# Patient Record
Sex: FEMALE | Race: OTHER | NOT HISPANIC OR LATINO | Employment: UNEMPLOYED | URBAN - METROPOLITAN AREA
[De-identification: names, ages, dates, MRNs, and addresses within clinical notes are randomized per-mention and may not be internally consistent; named-entity substitution may affect disease eponyms.]

---

## 2018-08-31 ENCOUNTER — APPOINTMENT (OUTPATIENT)
Dept: URGENT CARE | Age: 28
End: 2018-08-31
Payer: OTHER MISCELLANEOUS

## 2018-08-31 ENCOUNTER — APPOINTMENT (OUTPATIENT)
Dept: RADIOLOGY | Age: 28
End: 2018-08-31
Attending: PHYSICIAN ASSISTANT
Payer: OTHER MISCELLANEOUS

## 2018-08-31 DIAGNOSIS — S89.92XA KNEE INJURY, LEFT, INITIAL ENCOUNTER: ICD-10-CM

## 2018-08-31 DIAGNOSIS — S89.92XA KNEE INJURY, LEFT, INITIAL ENCOUNTER: Primary | ICD-10-CM

## 2018-08-31 PROCEDURE — 73564 X-RAY EXAM KNEE 4 OR MORE: CPT

## 2018-08-31 PROCEDURE — G0382 LEV 3 HOSP TYPE B ED VISIT: HCPCS | Performed by: PREVENTIVE MEDICINE

## 2018-08-31 PROCEDURE — 99283 EMERGENCY DEPT VISIT LOW MDM: CPT | Performed by: PREVENTIVE MEDICINE

## 2018-09-02 ENCOUNTER — APPOINTMENT (OUTPATIENT)
Dept: URGENT CARE | Age: 28
End: 2018-09-02
Payer: OTHER MISCELLANEOUS

## 2018-09-02 PROCEDURE — 99213 OFFICE O/P EST LOW 20 MIN: CPT

## 2018-09-06 ENCOUNTER — APPOINTMENT (OUTPATIENT)
Dept: URGENT CARE | Age: 28
End: 2018-09-06
Payer: OTHER MISCELLANEOUS

## 2018-09-06 PROCEDURE — 99213 OFFICE O/P EST LOW 20 MIN: CPT | Performed by: PREVENTIVE MEDICINE

## 2018-09-14 ENCOUNTER — APPOINTMENT (OUTPATIENT)
Dept: URGENT CARE | Age: 28
End: 2018-09-14
Payer: OTHER MISCELLANEOUS

## 2018-09-14 PROCEDURE — 99214 OFFICE O/P EST MOD 30 MIN: CPT | Performed by: PREVENTIVE MEDICINE

## 2018-09-18 ENCOUNTER — OFFICE VISIT (OUTPATIENT)
Dept: SURGERY | Facility: CLINIC | Age: 28
End: 2018-09-18
Payer: COMMERCIAL

## 2018-09-18 VITALS
BODY MASS INDEX: 25.69 KG/M2 | WEIGHT: 145 LBS | HEART RATE: 85 BPM | SYSTOLIC BLOOD PRESSURE: 118 MMHG | RESPIRATION RATE: 16 BRPM | TEMPERATURE: 97.9 F | DIASTOLIC BLOOD PRESSURE: 78 MMHG | HEIGHT: 63 IN

## 2018-09-18 DIAGNOSIS — R10.13 EPIGASTRIC PAIN: ICD-10-CM

## 2018-09-18 DIAGNOSIS — M62.08 RECTUS DIASTASIS: Primary | ICD-10-CM

## 2018-09-18 PROCEDURE — 99243 OFF/OP CNSLTJ NEW/EST LOW 30: CPT | Performed by: PHYSICIAN ASSISTANT

## 2018-09-18 RX ORDER — NAPROXEN 500 MG/1
500 TABLET ORAL 2 TIMES DAILY
Refills: 0 | COMMUNITY
Start: 2018-09-03

## 2018-09-18 RX ORDER — IBUPROFEN 600 MG/1
TABLET ORAL
Refills: 0 | COMMUNITY
Start: 2018-09-03

## 2018-09-18 NOTE — LETTER
September 18, 2018     Referral [address] [place] [id number]    Patient: Pippa Segura   YOB: 1990   Date of Visit: 9/18/2018       Dear Dr Irma Bolton:    Thank you for referring Pippa Segura to me for evaluation  Below are my notes for this consultation  If you have questions, please do not hesitate to call me  I look forward to following your patient along with you  Sincerely,        Aranza Blanco PA-C        CC: No Recipients  Daniel Guerrero  9/18/2018 11:34 AM  Sign at close encounter  Assessment/Plan:   Pippa Segura is a 33 y  o female who is here for The primary encounter diagnosis was Rectus diastasis  A diagnosis of Epigastric pain was also pertinent to this visit  Patient with abdominal bloating and bulge in midline of abdomen and is concern for hernia  Patient had prior abdominal plasty in the \[facility]\""  On physical exam no hernia defect mild laxity or recurrence rectus diastasis    Plan:  Refer to plastic surgeon for evaluation of previous abdominal plasty      _____________________________________________      HPI:  Pippa Segura is a 33 y  o female who was referred for evaluation of Mass (abdominal)    Patient with epigastric discomfort after eating and abdominal bloating and bulge from umbilicus to sternum  Currently complaining of  initial abdominal pain and bulge  Worse after eating,     no nausea and no vomiting,     regular bowel movement      Duration of pain or symptoms:  Intermittent and over 3 months      Prior abdominal surgery:   Yes, abdominal plasty      No results found for: WBC, HGB, HCT, MCV, PLT  No results found for: NA, K, CL, CO2, ANIONGAP, BUN, CREATININE, GLUCOSE, GLUF, CALCIUM, CORRECTEDCA, AST, ALT, ALKPHOS, PROT, ALBUMIN, BILITOT, EGFR  No results found for: INR, PROTIME        ROS:  General ROS: negative  negative for - chills, fatigue, fever or night sweats, weight loss  Respiratory ROS: no cough, shortness of breath, or wheezing  Cardiovascular ROS: no chest pain or dyspnea on exertion  Genito-Urinary ROS: no dysuria, trouble voiding, or hematuria  Musculoskeletal ROS: negative for - gait disturbance, joint pain or muscle pain  Neurological ROS: no TIA or stroke symptoms  Abdominal ROS: see HPI  GI ROS: see HPI  Skin ROS: no new rashes or lesions   Lymphatic ROS: no new adenopathy noted by pt  GYN ROS: see HPI, no new GYN history or bleeding noted  Psy ROS: no new mental or behavioral disturbances       There is no problem list on file for this patient  Allergies: Patient has no known allergies  Meds:  Current Outpatient Prescriptions:     ibuprofen (MOTRIN) 600 mg tablet, TAKE 1 TABLET 3 TIMES A DAY AS NEEDED FOR PAIN AND INFLAMMATION, Disp: , Rfl: 0    naproxen (NAPROSYN) 500 mg tablet, 500 mg 2 (two) times a day, Disp: , Rfl: 0    PMH:  Past Medical History:   Diagnosis Date    Migraine     Migraines        PSH:  Past Surgical History:   Procedure Laterality Date    TONSILECTOMY AND ADNOIDECTOMY         Family History   Problem Relation Age of Onset    Hypertension Mother     Hypertension Father         reports that she has never smoked  She has never used smokeless tobacco  She reports that she does not drink alcohol or use drugs        PHYSICAL EXAM  General Appearance:    Alert, cooperative, no distress   Head:    Normocephalic without obvious abnormality   Eyes:    PERRL, conjunctiva/corneas clear, EOM's intact        Neck:   Supple, no adenopathy, no JVD   Back:     Symmetric, no spinal or CVA tenderness   Lungs:     Clear to auscultation bilaterally, no wheezing or rhonchi   Heart:    Regular rate and rhythm, S1 and S2 normal, no murmur   Abdomen:      abdomen is soft without significant tenderness, masses, organomegaly or guarding Bowel sounds are normal     No ventral hernia noted     Extremities:   Extremities normal  No clubbing, cyanosis or edema   Psych:   Normal Affect, AOx3     Neurologic:  Skin:   CNII-XII intact  Strength symmetric, speech intact    Warm, dry, intact, no visible rashes or lesions                   Informed consent for procedure was personally discussed, reviewed, and signed by Dr Susana Holley  Discussion by Dr Susnaa Holley was carried out regarding risks, benefits, and alternatives with the patient  Risks include but are not limited to:  bleeding, infection, and delayed wound healing or an open wound, pulmonary embolus, leaks from bowel or bile ducts or other viscus, transfusions, death  Discussed in further detail the more common complications and their rates of occurrence   was used if necessary  Patient expressed understanding of the issues discussed and wished/consented to proceed  All questions were answered by Dr Susana Holley  Discussion performed between patient and the provider signing below  Signature:   Luke Terry    Date: 9/18/2018 Time: 11:31 AM       Some portions of this record may have been generated with voice recognition software  There may be translation, syntax,  or grammatical errors  Occasional wrong word or "sound-a-like" substitutions may have occurred due to the inherent limitations of the voice recognition software  Read the chart carefully and recognize, using context, where substitutions may have occurred  If you have any questions, please contact the dictating provider for clarification or correction, as needed  This encounter has been coded by a non-certified coder

## 2018-09-18 NOTE — PROGRESS NOTES
Assessment/Plan:   Marlene Georges is a 33 y  o female who is here for The primary encounter diagnosis was Rectus diastasis  A diagnosis of Epigastric pain was also pertinent to this visit  Patient with abdominal bloating and bulge in midline of abdomen and is concern for hernia  Patient had prior abdominal plasty in the [facility]  On physical exam no hernia defect mild laxity or recurrence rectus diastasis    Plan:  Refer to plastic surgeon for evaluation of previous abdominal plasty      _____________________________________________      HPI:  Marlene Georges is a 33 y  o female who was referred for evaluation of Mass (abdominal)    Patient with epigastric discomfort after eating and abdominal bloating and bulge from umbilicus to sternum  Patient with history of abdominal plasty in the past   Patient had 2 pregnancies  Currently complaining of  initial abdominal pain and bulge  Worse after eating,     no nausea and no vomiting,     regular bowel movement  Duration of pain or symptoms:  Intermittent and over 3 months      Prior abdominal surgery:   Yes, abdominal plasty      No results found for: WBC, HGB, HCT, MCV, PLT  No results found for: NA, K, CL, CO2, ANIONGAP, BUN, CREATININE, GLUCOSE, GLUF, CALCIUM, CORRECTEDCA, AST, ALT, ALKPHOS, PROT, ALBUMIN, BILITOT, EGFR  No results found for: INR, PROTIME        ROS:  General ROS: negative  negative for - chills, fatigue, fever or night sweats, weight loss  Respiratory ROS: no cough, shortness of breath, or wheezing  Cardiovascular ROS: no chest pain or dyspnea on exertion  Genito-Urinary ROS: no dysuria, trouble voiding, or hematuria  Musculoskeletal ROS: negative for - gait disturbance, joint pain or muscle pain  Neurological ROS: no TIA or stroke symptoms  Abdominal ROS: see HPI  GI ROS: see HPI  Skin ROS: no new rashes or lesions   Lymphatic ROS: no new adenopathy noted by pt     GYN ROS: see HPI, no new GYN history or bleeding noted  Psy ROS: no new mental or behavioral disturbances       There is no problem list on file for this patient  Allergies: Patient has no known allergies  Meds:  Current Outpatient Prescriptions:     ibuprofen (MOTRIN) 600 mg tablet, TAKE 1 TABLET 3 TIMES A DAY AS NEEDED FOR PAIN AND INFLAMMATION, Disp: , Rfl: 0    naproxen (NAPROSYN) 500 mg tablet, 500 mg 2 (two) times a day, Disp: , Rfl: 0    PMH:  Past Medical History:   Diagnosis Date    Migraine     Migraines        PSH:  Past Surgical History:   Procedure Laterality Date    TONSILECTOMY AND ADNOIDECTOMY         Family History   Problem Relation Age of Onset    Hypertension Mother     Hypertension Father         reports that she has never smoked  She has never used smokeless tobacco  She reports that she does not drink alcohol or use drugs  PHYSICAL EXAM  General Appearance:    Alert, cooperative, no distress   Head:    Normocephalic without obvious abnormality   Eyes:    PERRL, conjunctiva/corneas clear, EOM's intact        Neck:   Supple, no adenopathy, no JVD   Back:     Symmetric, no spinal or CVA tenderness   Lungs:     Clear to auscultation bilaterally, no wheezing or rhonchi   Heart:    Regular rate and rhythm, S1 and S2 normal, no murmur   Abdomen:      abdomen is soft without significant tenderness, masses, organomegaly or guarding Bowel sounds are normal     No ventral hernia noted     Extremities:   Extremities normal  No clubbing, cyanosis or edema   Psych:   Normal Affect, AOx3  Neurologic:  Skin:   CNII-XII intact  Strength symmetric, speech intact    Warm, dry, intact, no visible rashes or lesions                   Informed consent for procedure was personally discussed, reviewed, and signed by Dr Darlene Cantrell  Discussion by Dr Darlene Cantrell was carried out regarding risks, benefits, and alternatives with the patient   Risks include but are not limited to:  bleeding, infection, and delayed wound healing or an open wound, pulmonary embolus, leaks from bowel or bile ducts or other viscus, transfusions, death  Discussed in further detail the more common complications and their rates of occurrence   was used if necessary  Patient expressed understanding of the issues discussed and wished/consented to proceed  All questions were answered by Dr Susana Holley  Discussion performed between patient and the provider signing below  Signature:   Luke Harrison    Date: 9/18/2018 Time: 11:31 AM       Some portions of this record may have been generated with voice recognition software  There may be translation, syntax,  or grammatical errors  Occasional wrong word or "sound-a-like" substitutions may have occurred due to the inherent limitations of the voice recognition software  Read the chart carefully and recognize, using context, where substitutions may have occurred  If you have any questions, please contact the dictating provider for clarification or correction, as needed  This encounter has been coded by a non-certified coder

## 2018-09-28 ENCOUNTER — APPOINTMENT (OUTPATIENT)
Dept: URGENT CARE | Age: 28
End: 2018-09-28
Payer: OTHER MISCELLANEOUS

## 2018-09-28 PROCEDURE — 99214 OFFICE O/P EST MOD 30 MIN: CPT | Performed by: PREVENTIVE MEDICINE

## 2018-12-17 ENCOUNTER — OFFICE VISIT (OUTPATIENT)
Dept: URGENT CARE | Age: 28
End: 2018-12-17
Payer: COMMERCIAL

## 2018-12-17 VITALS
OXYGEN SATURATION: 100 % | TEMPERATURE: 99 F | HEIGHT: 64 IN | HEART RATE: 140 BPM | BODY MASS INDEX: 24.59 KG/M2 | DIASTOLIC BLOOD PRESSURE: 68 MMHG | SYSTOLIC BLOOD PRESSURE: 115 MMHG | WEIGHT: 144 LBS

## 2018-12-17 DIAGNOSIS — R51.9 ACUTE INTRACTABLE HEADACHE, UNSPECIFIED HEADACHE TYPE: ICD-10-CM

## 2018-12-17 DIAGNOSIS — J11.1 INFLUENZA: Primary | ICD-10-CM

## 2018-12-17 DIAGNOSIS — R00.0 TACHYCARDIA: ICD-10-CM

## 2018-12-17 PROCEDURE — 99213 OFFICE O/P EST LOW 20 MIN: CPT | Performed by: FAMILY MEDICINE

## 2018-12-17 NOTE — PROGRESS NOTES
[phone]        NAME: America Alvarez is a 33 y o  female  : 1990    MRN: [id number]  DATE: 2018  TIME: 12:53 PM    Assessment and Plan   Influenza [J11 1]  1  Influenza  Transfer to other facility   2  Tachycardia  Transfer to other facility   3  Acute intractable headache, unspecified headache type  Transfer to other facility         Patient Instructions       Follow up with PCP in 3-5 days  Proceed to  ER if symptoms worsen  Chief Complaint     Chief Complaint   Patient presents with    Headache     c/o migraine x friday, with nausea and vomiting, sensitive to sounds and lights   Cough     chills, cough and post nasal drip          History of Present Illness       Patient is here for evaluation of rapid onset of headache, fevers, chills, body aches on Friday  Patient has had some cough and congestion as well  She states last night she was unable to sleep due to the headache which was not relieved by any of her usual medications  She has been feeling lightheaded and dizzy at times off and on  Her kids were sick recently with upper stye infections but neither of them for this severe          Review of Systems   Review of Systems      Current Medications       Current Outpatient Prescriptions:     ibuprofen (MOTRIN) 600 mg tablet, TAKE 1 TABLET 3 TIMES A DAY AS NEEDED FOR PAIN AND INFLAMMATION, Disp: , Rfl: 0    naproxen (NAPROSYN) 500 mg tablet, 500 mg 2 (two) times a day, Disp: , Rfl: 0    Current Allergies     Allergies as of 2018    (No Known Allergies)            The following portions of the patient's history were reviewed and updated as appropriate: allergies, current medications, past family history, past medical history, past social history, past surgical history and problem list      Past Medical History:   Diagnosis Date    Migraine     Migraines        Past Surgical History:   Procedure Laterality Date    TONSILECTOMY AND ADNOIDECTOMY         Family History   Problem Relation Age of Onset    Hypertension Mother     Hypertension Father          Medications have been verified  Objective   /68   Pulse (!) 140   Temp 99 °F (37 2 °C)   Ht 5' 4" (1 626 m)   Wt 65 3 kg (144 lb)   LMP 12/17/2018   SpO2 100%   BMI 24 72 kg/m²        Physical Exam     Physical Exam   Constitutional: She is oriented to person, place, and time  She appears well-developed and well-nourished  She appears ill  HENT:   Head: Normocephalic and atraumatic  Right Ear: External ear normal    Left Ear: External ear normal    Mild erythema bilateral tonsils  No soft tissue swelling  No exudate  Bilateral nasal erythema with mild congestion  Clear rhinorrhea present  Eyes: Pupils are equal, round, and reactive to light  Conjunctivae and EOM are normal  Right eye exhibits no discharge  Left eye exhibits no discharge  Neck: Normal range of motion  Neck supple  Cardiovascular: Normal heart sounds  Tachycardia present  No murmur heard  132-140 bpm   Pulmonary/Chest: Effort normal and breath sounds normal  No respiratory distress  She has no wheezes  She has no rales  Lymphadenopathy:     She has no cervical adenopathy  Neurological: She is alert and oriented to person, place, and time  Skin: Skin is warm and dry  Psychiatric: She has a normal mood and affect  Her behavior is normal    Nursing note and vitals reviewed

## 2019-11-29 ENCOUNTER — HOSPITAL ENCOUNTER (EMERGENCY)
Facility: HOSPITAL | Age: 29
Discharge: HOME | End: 2019-11-30
Attending: EMERGENCY MEDICINE | Admitting: EMERGENCY MEDICINE
Payer: MEDICARE

## 2019-11-29 ENCOUNTER — APPOINTMENT (EMERGENCY)
Dept: CT IMAGING | Facility: HOSPITAL | Age: 29
End: 2019-11-29
Payer: MEDICARE

## 2019-11-29 DIAGNOSIS — E86.0 DEHYDRATION: ICD-10-CM

## 2019-11-29 DIAGNOSIS — K80.20 CHOLELITHIASES: ICD-10-CM

## 2019-11-29 DIAGNOSIS — R10.9 ABDOMINAL PAIN: Primary | ICD-10-CM

## 2019-11-29 LAB
ALBUMIN SERPL BCP-MCNC: 4.7 G/DL (ref 3–5.2)
ALP SERPL-CCNC: 76 U/L (ref 43–122)
ALT SERPL W P-5'-P-CCNC: 51 U/L (ref 9–52)
ANION GAP SERPL CALCULATED.3IONS-SCNC: 12 MMOL/L (ref 5–14)
APTT PPP: 22 SECONDS (ref 25–32)
AST SERPL W P-5'-P-CCNC: 94 U/L (ref 14–36)
BILIRUB SERPL-MCNC: 0.3 MG/DL
BUN SERPL-MCNC: 11 MG/DL (ref 5–25)
CALCIUM SERPL-MCNC: 9.8 MG/DL (ref 8.4–10.2)
CHLORIDE SERPL-SCNC: 104 MMOL/L (ref 97–108)
CO2 SERPL-SCNC: 24 MMOL/L (ref 22–30)
CREAT SERPL-MCNC: 0.47 MG/DL (ref 0.6–1.2)
ERYTHROCYTE [DISTWIDTH] IN BLOOD BY AUTOMATED COUNT: 17.4 %
GFR SERPL CREATININE-BSD FRML MDRD: 135 ML/MIN/1.73SQ M
GLUCOSE SERPL-MCNC: 128 MG/DL (ref 70–99)
HCG SERPL QL: NEGATIVE
HCT VFR BLD AUTO: 38.2 % (ref 36–46)
HGB BLD-MCNC: 12.1 G/DL (ref 12–16)
INR PPP: 0.94 (ref 0.91–1.09)
LACTATE SERPL-SCNC: 2.3 MMOL/L (ref 0.7–2)
LIPASE SERPL-CCNC: 137 U/L (ref 23–300)
LYMPHOCYTES # BLD AUTO: 29 % (ref 25–45)
LYMPHOCYTES # BLD AUTO: 4.18 THOUSAND/UL (ref 0.5–4)
MAGNESIUM SERPL-MCNC: 2.2 MG/DL (ref 1.6–2.3)
MCH RBC QN AUTO: 23.8 PG (ref 26–34)
MCHC RBC AUTO-ENTMCNC: 31.6 G/DL (ref 31–36)
MCV RBC AUTO: 75 FL (ref 80–100)
MONOCYTES # BLD AUTO: 0.43 THOUSAND/UL (ref 0.2–0.9)
MONOCYTES NFR BLD AUTO: 3 % (ref 1–10)
NEUTS SEG # BLD: 9.5 THOUSAND/UL (ref 1.8–7.8)
NEUTS SEG NFR BLD AUTO: 66 %
PLATELET # BLD AUTO: 391 THOUSANDS/UL (ref 150–450)
PLATELET BLD QL SMEAR: ADEQUATE
PMV BLD AUTO: 8.1 FL (ref 8.9–12.7)
POTASSIUM SERPL-SCNC: 3.8 MMOL/L (ref 3.6–5)
PROT SERPL-MCNC: 8.5 G/DL (ref 5.9–8.4)
PROTHROMBIN TIME: 10.3 SECONDS (ref 9.8–12)
RBC # BLD AUTO: 5.08 MILLION/UL (ref 4–5.2)
RBC MORPH BLD: NORMAL
SODIUM SERPL-SCNC: 140 MMOL/L (ref 137–147)
TOTAL CELLS COUNTED SPEC: 100
VARIANT LYMPHS # BLD AUTO: 2 % (ref 0–0)
WBC # BLD AUTO: 14.4 THOUSAND/UL (ref 4.5–11)

## 2019-11-29 PROCEDURE — 96374 THER/PROPH/DIAG INJ IV PUSH: CPT

## 2019-11-29 PROCEDURE — 85007 BL SMEAR W/DIFF WBC COUNT: CPT | Performed by: EMERGENCY MEDICINE

## 2019-11-29 PROCEDURE — 85610 PROTHROMBIN TIME: CPT | Performed by: EMERGENCY MEDICINE

## 2019-11-29 PROCEDURE — 74177 CT ABD & PELVIS W/CONTRAST: CPT

## 2019-11-29 PROCEDURE — 83605 ASSAY OF LACTIC ACID: CPT | Performed by: EMERGENCY MEDICINE

## 2019-11-29 PROCEDURE — 84703 CHORIONIC GONADOTROPIN ASSAY: CPT | Performed by: EMERGENCY MEDICINE

## 2019-11-29 PROCEDURE — 85027 COMPLETE CBC AUTOMATED: CPT | Performed by: EMERGENCY MEDICINE

## 2019-11-29 PROCEDURE — 99285 EMERGENCY DEPT VISIT HI MDM: CPT

## 2019-11-29 PROCEDURE — 80053 COMPREHEN METABOLIC PANEL: CPT | Performed by: EMERGENCY MEDICINE

## 2019-11-29 PROCEDURE — 83735 ASSAY OF MAGNESIUM: CPT | Performed by: EMERGENCY MEDICINE

## 2019-11-29 PROCEDURE — 96361 HYDRATE IV INFUSION ADD-ON: CPT

## 2019-11-29 PROCEDURE — 99284 EMERGENCY DEPT VISIT MOD MDM: CPT | Performed by: EMERGENCY MEDICINE

## 2019-11-29 PROCEDURE — 36415 COLL VENOUS BLD VENIPUNCTURE: CPT | Performed by: EMERGENCY MEDICINE

## 2019-11-29 PROCEDURE — 96375 TX/PRO/DX INJ NEW DRUG ADDON: CPT

## 2019-11-29 PROCEDURE — 85730 THROMBOPLASTIN TIME PARTIAL: CPT | Performed by: EMERGENCY MEDICINE

## 2019-11-29 PROCEDURE — 83690 ASSAY OF LIPASE: CPT | Performed by: EMERGENCY MEDICINE

## 2019-11-29 RX ORDER — DIPHENHYDRAMINE HYDROCHLORIDE 50 MG/ML
25 INJECTION INTRAMUSCULAR; INTRAVENOUS ONCE
Status: COMPLETED | OUTPATIENT
Start: 2019-11-29 | End: 2019-11-29

## 2019-11-29 RX ORDER — FENTANYL CITRATE 50 UG/ML
INJECTION, SOLUTION INTRAMUSCULAR; INTRAVENOUS
Status: COMPLETED
Start: 2019-11-29 | End: 2019-11-29

## 2019-11-29 RX ORDER — METOCLOPRAMIDE HYDROCHLORIDE 5 MG/ML
10 INJECTION INTRAMUSCULAR; INTRAVENOUS ONCE
Status: COMPLETED | OUTPATIENT
Start: 2019-11-29 | End: 2019-11-29

## 2019-11-29 RX ADMIN — DIPHENHYDRAMINE HYDROCHLORIDE 25 MG: 50 INJECTION INTRAMUSCULAR; INTRAVENOUS at 23:04

## 2019-11-29 RX ADMIN — IOHEXOL 100 ML: 350 INJECTION, SOLUTION INTRAVENOUS at 23:32

## 2019-11-29 RX ADMIN — METOCLOPRAMIDE 10 MG: 5 INJECTION, SOLUTION INTRAMUSCULAR; INTRAVENOUS at 23:04

## 2019-11-29 RX ADMIN — SODIUM CHLORIDE 1000 ML: 0.9 INJECTION, SOLUTION INTRAVENOUS at 23:52

## 2019-11-29 RX ADMIN — SODIUM CHLORIDE 1000 ML: 0.9 INJECTION, SOLUTION INTRAVENOUS at 22:36

## 2019-11-30 VITALS
HEIGHT: 64 IN | BODY MASS INDEX: 25.18 KG/M2 | WEIGHT: 147.49 LBS | RESPIRATION RATE: 14 BRPM | OXYGEN SATURATION: 100 % | DIASTOLIC BLOOD PRESSURE: 72 MMHG | HEART RATE: 98 BPM | TEMPERATURE: 98.1 F | SYSTOLIC BLOOD PRESSURE: 114 MMHG

## 2019-11-30 LAB
BILIRUB UR QL STRIP: NEGATIVE
CLARITY UR: CLEAR
COLOR UR: NORMAL
GLUCOSE UR STRIP-MCNC: NEGATIVE MG/DL
HGB UR QL STRIP.AUTO: NEGATIVE
KETONES UR STRIP-MCNC: NEGATIVE MG/DL
LACTATE SERPL-SCNC: 1.3 MMOL/L (ref 0.7–2)
LEUKOCYTE ESTERASE UR QL STRIP: NEGATIVE
NITRITE UR QL STRIP: NEGATIVE
PH UR STRIP.AUTO: 7 [PH]
PROT UR STRIP-MCNC: NEGATIVE MG/DL
SP GR UR STRIP.AUTO: 1.01 (ref 1–1.04)
UROBILINOGEN UA: NEGATIVE MG/DL

## 2019-11-30 PROCEDURE — 36415 COLL VENOUS BLD VENIPUNCTURE: CPT | Performed by: EMERGENCY MEDICINE

## 2019-11-30 PROCEDURE — 83605 ASSAY OF LACTIC ACID: CPT | Performed by: EMERGENCY MEDICINE

## 2019-11-30 RX ORDER — HYDROCODONE BITARTRATE AND ACETAMINOPHEN 5; 325 MG/1; MG/1
1 TABLET ORAL EVERY 6 HOURS PRN
Qty: 16 TABLET | Refills: 0 | Status: SHIPPED | OUTPATIENT
Start: 2019-11-30 | End: 2019-12-04

## 2019-11-30 RX ORDER — HYDROCODONE BITARTRATE AND ACETAMINOPHEN 5; 325 MG/1; MG/1
1 TABLET ORAL ONCE
Status: COMPLETED | OUTPATIENT
Start: 2019-11-30 | End: 2019-11-30

## 2019-11-30 RX ADMIN — HYDROCODONE BITARTRATE AND ACETAMINOPHEN 1 TABLET: 5; 325 TABLET ORAL at 00:51

## 2019-11-30 NOTE — ED NOTES
Patient is resting comfortably, provided with warm blanket and 2 visitors @ bedside     Akshat Mejia  11/29/19 [id number]

## 2019-11-30 NOTE — ED PROVIDER NOTES
History  Chief Complaint   Patient presents with    Abdominal Pain     Per patient abdominal pain started 1/2 hour prior to arrival, denies N/V/D, last ate 2 hours ago lasogna and potatoe salad  Patient is a healthy 21-year-old female female coming in by EMS complaining of abdominal pain  She states this started about an hour ago  She ate approximately 1-2 hours ago of potato salad and lasagna just to name a few  She has never had this pain before  She has no travel, sick contacts, recent surgeries  She has no trauma to the abdomen  She has no nausea, vomiting or diarrhea  Patient did take anything for this pain and describes the pain as a squeezing sensation in the epigastric region to the right upper quadrant wrapping around her right shoulder blade  Patient is really around in bed and appears in distress  She has no chest pain, shortness of breath, palpitations or syncope        History provided by:  Patient and EMS personnel   used: No    Abdominal Pain   Pain location:  Epigastric and RUQ  Pain quality: aching, cramping, fullness, gnawing, squeezing and throbbing    Pain radiates to:  R shoulder  Pain severity:  Moderate  Onset quality:  Gradual  Timing:  Constant  Progression:  Worsening  Chronicity:  New  Context: not alcohol use, not awakening from sleep, not diet changes, not eating, not laxative use, not medication withdrawal, not previous surgeries, not recent illness, not recent sexual activity, not recent travel, not retching, not sick contacts, not suspicious food intake and not trauma    Relieved by:  None tried  Worsened by:  Nothing  Ineffective treatments:  None tried  Associated symptoms: no anorexia, no belching, no chest pain, no chills, no constipation, no cough, no diarrhea, no dysuria, no fatigue, no fever, no flatus, no hematemesis, no hematochezia, no hematuria, no melena, no nausea, no shortness of breath, no sore throat, no vaginal bleeding, no vaginal discharge and no vomiting    Risk factors: no alcohol abuse, no aspirin use, not elderly, has not had multiple surgeries, no NSAID use, not obese, not pregnant and no recent hospitalization        Prior to Admission Medications   Prescriptions Last Dose Informant Patient Reported? Taking?   ibuprofen (MOTRIN) 600 mg tablet Not Taking at Unknown time Self Yes No   Sig: TAKE 1 TABLET 3 TIMES A DAY AS NEEDED FOR PAIN AND INFLAMMATION   naproxen (NAPROSYN) 500 mg tablet Not Taking at Unknown time Self Yes No   Si mg 2 (two) times a day      Facility-Administered Medications: None       Past Medical History:   Diagnosis Date    Migraine     Migraines        Past Surgical History:   Procedure Laterality Date    TONSILECTOMY AND ADNOIDECTOMY         Family History   Problem Relation Age of Onset    Hypertension Mother     Hypertension Father      I have reviewed and agree with the history as documented  Social History     Tobacco Use    Smoking status: Never Smoker    Smokeless tobacco: Never Used   Substance Use Topics    Alcohol use: No    Drug use: No        Review of Systems   Constitutional: Negative  Negative for chills, diaphoresis, fatigue and fever  HENT: Negative  Negative for ear pain and sore throat  Eyes: Negative  Negative for visual disturbance  Respiratory: Negative  Negative for cough, chest tightness and shortness of breath  Cardiovascular: Negative  Negative for chest pain and palpitations  Gastrointestinal: Positive for abdominal pain  Negative for anorexia, constipation, diarrhea, flatus, hematemesis, hematochezia, melena, nausea and vomiting  Endocrine: Negative  Genitourinary: Negative  Negative for difficulty urinating, dysuria, hematuria, vaginal bleeding and vaginal discharge  Musculoskeletal: Negative  Negative for back pain and neck pain  Skin: Negative for rash  Neurological: Negative  Negative for weakness  Hematological: Negative  Psychiatric/Behavioral: Negative  Negative for confusion  All other systems reviewed and are negative  Physical Exam  Physical Exam   Constitutional: She is oriented to person, place, and time  She appears well-developed and well-nourished  She appears distressed  Patient appears in discomfort  HENT:   Head: Normocephalic and atraumatic  Mouth/Throat: Oropharynx is clear and moist    Patient maintaining airway maintaining secretions   Eyes: Pupils are equal, round, and reactive to light  Conjunctivae and EOM are normal    Neck: Normal range of motion  Neck supple  Cardiovascular: Regular rhythm, normal heart sounds and intact distal pulses  Tachycardia present  No murmur heard  Pulses:       Radial pulses are 2+ on the right side, and 2+ on the left side  Dorsalis pedis pulses are 2+ on the right side, and 2+ on the left side  Pulmonary/Chest: Effort normal and breath sounds normal  No stridor  No respiratory distress  Abdominal: Soft  Bowel sounds are normal  She exhibits no distension  There is tenderness in the right upper quadrant and epigastric area  There is positive Mcmahon's sign  There is no rigidity, no guarding, no CVA tenderness and no tenderness at McBurney's point  Musculoskeletal: Normal range of motion  She exhibits no edema  Neurological: She is alert and oriented to person, place, and time  No cranial nerve deficit  GCS eye subscore is 4  GCS verbal subscore is 5  GCS motor subscore is 6  No slurred speech  No facial asymmetry  Skin: Skin is warm  Capillary refill takes less than 2 seconds  She is not diaphoretic  Nursing note and vitals reviewed        Vital Signs  ED Triage Vitals [11/29/19 2227]   Temperature Pulse Respirations Blood Pressure SpO2   98 1 °F (36 7 °C) (!) 111 20 153/78 100 %      Temp Source Heart Rate Source Patient Position - Orthostatic VS BP Location FiO2 (%)   Tympanic Monitor Lying Left arm --      Pain Score       Worst Possible Pain           Vitals:    11/29/19 2227 11/29/19 2309   BP: 153/78 120/71   Pulse: (!) 111 105   Patient Position - Orthostatic VS: Lying Lying         Visual Acuity      ED Medications  Medications   sodium chloride 0 9 % bolus 1,000 mL (1,000 mL Intravenous New Bag 11/29/19 2352)   sodium chloride 0 9 % bolus 1,000 mL (0 mL Intravenous Stopped 11/29/19 2338)   metoclopramide (REGLAN) injection 10 mg (10 mg Intravenous Given 11/29/19 2304)   diphenhydrAMINE (BENADRYL) injection 25 mg (25 mg Intravenous Given 11/29/19 2304)   fentanyl citrate (PF) 100 MCG/2ML **ADS Override Pull** (  Given to EMS 11/29/19 2230)   iohexol (OMNIPAQUE) 350 MG/ML injection (MULTI-DOSE) 100 mL (100 mL Intravenous Given 11/29/19 2332)       Diagnostic Studies  Results Reviewed     Procedure Component Value Units Date/Time    UA (URINE) with reflex to Scope [[id number]]  (Normal) Collected:  11/29/19 2354    Lab Status:  Final result Specimen:  Urine, Clean Catch Updated:  11/30/19 0001     Color, UA Straw     Clarity, UA Clear     Specific Gravity, UA 1 010     pH, UA 7 0     Leukocytes, UA Negative     Nitrite, UA Negative     Protein, UA Negative mg/dl      Glucose, UA Negative mg/dl      Ketones, UA Negative mg/dl      Bilirubin, UA Negative     Blood, UA Negative     UROBILINOGEN UA Negative mg/dL     hCG, qualitative pregnancy [[id number]]  (Normal) Collected:  11/29/19 2232    Lab Status:  Final result Specimen:  Blood from Arm, Left Updated:  11/29/19 2317     Preg, Serum Negative    Lactic acid, plasma [[id number]]     Lab Status:  No result Specimen:  Blood     Protime-INR [[id number]]  (Normal) Collected:  11/29/19 2232    Lab Status:  Final result Specimen:  Blood from Arm, Left Updated:  11/29/19 2302     Protime 10 3 seconds      INR 0 94    APTT [[id number]]  (Abnormal) Collected:  11/29/19 2232    Lab Status:  Final result Specimen:  Blood from Arm, Left Updated:  11/29/19 2302     PTT 22 seconds     Comprehensive metabolic panel [[id number]]  (Abnormal) Collected:  11/29/19 2232    Lab Status:  Final result Specimen:  Blood from Arm, Left Updated:  11/29/19 2301     Sodium 140 mmol/L      Potassium 3 8 mmol/L      Chloride 104 mmol/L      CO2 24 mmol/L      ANION GAP 12 mmol/L      BUN 11 mg/dL      Creatinine 0 47 mg/dL      Glucose 128 mg/dL      Calcium 9 8 mg/dL      AST 94 U/L      ALT 51 U/L      Alkaline Phosphatase 76 U/L      Total Protein 8 5 g/dL      Albumin 4 7 g/dL      Total Bilirubin 0 30 mg/dL      eGFR 135 ml/min/1 73sq m     Narrative:       [place] guidelines for Chronic Kidney Disease (CKD):     Stage 1 with normal or high GFR (GFR > 90 mL/min/1 73 square meters)    Stage 2 Mild CKD (GFR = 60-89 mL/min/1 73 square meters)    Stage 3A Moderate CKD (GFR = 45-59 mL/min/1 73 square meters)    Stage 3B Moderate CKD (GFR = 30-44 mL/min/1 73 square meters)    Stage 4 Severe CKD (GFR = 15-29 mL/min/1 73 square meters)    Stage 5 End Stage CKD (GFR <15 mL/min/1 73 square meters)  Note: GFR calculation is accurate only with a steady state creatinine    Magnesium [[id number]]  (Normal) Collected:  11/29/19 2232    Lab Status:  Final result Specimen:  Blood from Arm, Left Updated:  11/29/19 2301     Magnesium 2 2 mg/dL     Lipase [[id number]]  (Normal) Collected:  11/29/19 2232    Lab Status:  Final result Specimen:  Blood from Arm, Left Updated:  11/29/19 2301     Lipase 137 u/L     Lactic acid, plasma [[id number]]  (Abnormal) Collected:  11/29/19 2232    Lab Status:  Final result Specimen:  Blood from Arm, Left Updated:  11/29/19 2301     LACTIC ACID 2 3 mmol/L     Narrative:       Result may be elevated if tourniquet was used during collection      CBC and differential [[id number]]  (Abnormal) Collected:  11/29/19 2232    Lab Status:  Final result Specimen:  Blood from Arm, Left Updated:  11/29/19 2249     WBC 14 40 Thousand/uL      RBC 5 08 Million/uL      Hemoglobin 12 1 g/dL      Hematocrit 38 2 %      MCV 75 fL      MCH 23 8 pg      MCHC 31 6 g/dL      RDW 17 4 %      MPV 8 1 fL      Platelets 271 Thousands/uL                  CT abdomen pelvis with contrast   Final Result by Eze Guzman MD (11/29 3122)         1  Significant gastric distention with solid food contents  No evidence of gastritis or gastric outlet obstruction  Possible gastroparesis  2   Cholelithiasis and possible sludge in the gallbladder without evidence of acute cholecystitis or biliary obstruction  Ultrasound may be helpful if there is persistent concern for acute cholecystitis  Workstation performed: [id number]                    Procedures  Procedures       ED Course  ED Course as of Nov 30 0042   Gradie Scale Nov 29, 2019   2230 Patient is a 52-year-old female coming in today with abdominal pain that started approximately an hour ago after eating  Patient on exam appears in pain and is rolling around in bed  She is feeling better after the fentanyl given by medics  Patient on exam does have epigastric and right upper quadrant pain consistent with positive Mcmahon sign  Will give Reglan and Benadryl for pain as well as check labs and CT scan  Portions of the record may have been created with voice recognition software  Occasional wrong word or "sound a like" substitutions may have occurred due to the inherent limitations of voice recognition software  Read the chart carefully and recognize, using context, where substitutions have occurred  2305 Patient does have leukocytosis greater than 14 and pending differential   Lactic acid is greater than 2  Will give 2 L and repeat lactic acid      2319 Patient does have elevated segs at 66  She is resting calmly in bed with family at bedside  Pregnancy is negative  Will send to CT         2341 Patient returns from CT  On exam patient is resting much more comfortably  She states the medication has helped    Her Re exam does show mild tenderness epigastric and right upper quadrant but in clear comparison is a difference to prior  She is aware PE pending CT results      Sat Nov 30, 2019   0000 CT shows gallstone and sludge  Given her white count, lactic acidosis will discuss with surgery on-call - Dr Buddy Mancilla  3332 Discussed with Dr Buddy Mancilla - agrees will trial p o  and discharged home with instructions to follow up with surgery and a low-fat diet  65 Long discussion with patient regarding my discussion with surgeon  Patient states that she wants to have cake when she goes home  I discussed with her that she is not have any cake and is impaired that she stick to a low-fat diet including a low greasy and spicy foods  Her lactate is normal   She is resting in bed more comfortably  I reassured her and rediscussed with her when to return to the ER including fevers, worsening pain  Will give her Norco before discharge and Norco to go home  Also give her doctor review does number for follow-up                                  MDM      Disposition  Final diagnoses:   Abdominal pain     Time reflects when diagnosis was documented in both MDM as applicable and the Disposition within this note     Time User Action Codes Description Comment    11/30/2019 12:09 AM Patricia Hammer Add [R10 9] Abdominal pain       ED Disposition     None      Follow-up Information    None         Patient's Medications   Discharge Prescriptions    No medications on file     No discharge procedures on file      ED Provider  Electronically Signed by           Fide Cueva DO  12/01/19 5973

## 2019-11-30 NOTE — DISCHARGE INSTRUCTIONS
You must stick to a low-fat diet  You have to stay away from fatty foods, greasy foods, spicy foods  The name of the surgeon you must call Monday for an appointment    If you have return of pain that persists despite pain medications you must return to the ER  If you have development of fevers you must return to the ER    B R A T  DIET Your doctor has recommended the B R A T  diet for you or your child until his or her condition improves  This is often used to help control diarrhea and vomiting symptoms  If you or your child can tolerate clear liquids, you may offer: · Bananas · Rice · Applesauce · Toast (and other simple starches such as crackers, potatoes, noodles) Be sure to avoid dairy products, meats, and fatty foods until your child's symptoms are completely better

## 2024-02-21 DIAGNOSIS — Z00.6 ENCOUNTER FOR EXAMINATION FOR NORMAL COMPARISON OR CONTROL IN CLINICAL RESEARCH PROGRAM: ICD-10-CM

## 2024-02-21 PROBLEM — J11.1 INFLUENZA: Status: RESOLVED | Noted: 2018-12-17 | Resolved: 2024-02-21

## 2024-02-24 ENCOUNTER — APPOINTMENT (OUTPATIENT)
Dept: LAB | Facility: HOSPITAL | Age: 34
End: 2024-02-24

## 2024-02-24 DIAGNOSIS — Z00.6 ENCOUNTER FOR EXAMINATION FOR NORMAL COMPARISON OR CONTROL IN CLINICAL RESEARCH PROGRAM: ICD-10-CM

## 2024-02-24 PROCEDURE — 36415 COLL VENOUS BLD VENIPUNCTURE: CPT

## 2024-03-16 LAB
APOB+LDLR+PCSK9 GENE MUT ANL BLD/T: NOT DETECTED
BRCA1+BRCA2 DEL+DUP + FULL MUT ANL BLD/T: NOT DETECTED
MLH1+MSH2+MSH6+PMS2 GN DEL+DUP+FUL M: NOT DETECTED